# Patient Record
Sex: FEMALE
[De-identification: names, ages, dates, MRNs, and addresses within clinical notes are randomized per-mention and may not be internally consistent; named-entity substitution may affect disease eponyms.]

---

## 2020-02-07 PROBLEM — Z00.00 ENCOUNTER FOR PREVENTIVE HEALTH EXAMINATION: Status: ACTIVE | Noted: 2020-02-07

## 2020-02-10 ENCOUNTER — APPOINTMENT (OUTPATIENT)
Dept: PHYSICAL MEDICINE AND REHAB | Facility: CLINIC | Age: 65
End: 2020-02-10
Payer: OTHER MISCELLANEOUS

## 2020-02-10 VITALS
HEIGHT: 63 IN | OXYGEN SATURATION: 95 % | BODY MASS INDEX: 33.66 KG/M2 | HEART RATE: 65 BPM | RESPIRATION RATE: 16 BRPM | WEIGHT: 190 LBS | SYSTOLIC BLOOD PRESSURE: 143 MMHG | DIASTOLIC BLOOD PRESSURE: 90 MMHG

## 2020-02-10 DIAGNOSIS — Z86.79 PERSONAL HISTORY OF OTHER DISEASES OF THE CIRCULATORY SYSTEM: ICD-10-CM

## 2020-02-10 DIAGNOSIS — Z87.39 PERSONAL HISTORY OF OTHER DISEASES OF THE MUSCULOSKELETAL SYSTEM AND CONNECTIVE TISSUE: ICD-10-CM

## 2020-02-10 DIAGNOSIS — Z78.9 OTHER SPECIFIED HEALTH STATUS: ICD-10-CM

## 2020-02-10 DIAGNOSIS — Z80.0 FAMILY HISTORY OF MALIGNANT NEOPLASM OF DIGESTIVE ORGANS: ICD-10-CM

## 2020-02-10 DIAGNOSIS — Z82.49 FAMILY HISTORY OF ISCHEMIC HEART DISEASE AND OTHER DISEASES OF THE CIRCULATORY SYSTEM: ICD-10-CM

## 2020-02-10 DIAGNOSIS — Z80.1 FAMILY HISTORY OF MALIGNANT NEOPLASM OF TRACHEA, BRONCHUS AND LUNG: ICD-10-CM

## 2020-02-10 DIAGNOSIS — Z86.39 PERSONAL HISTORY OF OTHER ENDOCRINE, NUTRITIONAL AND METABOLIC DISEASE: ICD-10-CM

## 2020-02-10 DIAGNOSIS — Z87.19 PERSONAL HISTORY OF OTHER DISEASES OF THE DIGESTIVE SYSTEM: ICD-10-CM

## 2020-02-10 PROCEDURE — 99203 OFFICE O/P NEW LOW 30 MIN: CPT

## 2020-02-10 RX ORDER — PROPRANOLOL HYDROCHLORIDE 60 MG/1
60 CAPSULE, EXTENDED RELEASE ORAL
Qty: 30 | Refills: 0 | Status: DISCONTINUED | COMMUNITY
Start: 2019-07-26

## 2020-02-10 RX ORDER — AMOXICILLIN AND CLAVULANATE POTASSIUM 875; 125 MG/1; MG/1
875-125 TABLET, COATED ORAL
Qty: 20 | Refills: 0 | Status: DISCONTINUED | COMMUNITY
Start: 2019-11-25

## 2020-02-10 RX ORDER — OLMESARTAN MEDOXOMIL AND HYDROCHLOROTHIAZIDE 20; 12.5 MG/1; MG/1
20-12.5 TABLET ORAL
Qty: 90 | Refills: 0 | Status: ACTIVE | COMMUNITY
Start: 2019-09-03

## 2020-02-10 RX ORDER — VENLAFAXINE 37.5 MG/1
37.5 TABLET, EXTENDED RELEASE ORAL
Qty: 30 | Refills: 0 | Status: DISCONTINUED | COMMUNITY
Start: 2019-09-27

## 2020-02-10 RX ORDER — ONDANSETRON 4 MG/1
4 TABLET ORAL
Qty: 20 | Refills: 0 | Status: DISCONTINUED | COMMUNITY
Start: 2019-09-27

## 2020-02-10 RX ORDER — ATORVASTATIN CALCIUM 20 MG/1
20 TABLET, FILM COATED ORAL
Qty: 90 | Refills: 0 | Status: ACTIVE | COMMUNITY
Start: 2019-11-12

## 2020-02-10 RX ORDER — METHYLPREDNISOLONE 4 MG/1
4 TABLET ORAL
Qty: 21 | Refills: 0 | Status: DISCONTINUED | COMMUNITY
Start: 2020-01-20

## 2020-02-10 RX ORDER — BENZONATATE 200 MG/1
200 CAPSULE ORAL
Qty: 20 | Refills: 0 | Status: DISCONTINUED | COMMUNITY
Start: 2019-11-25

## 2020-02-10 RX ORDER — VALACYCLOVIR 1 G/1
1 TABLET, FILM COATED ORAL
Qty: 30 | Refills: 0 | Status: DISCONTINUED | COMMUNITY
Start: 2019-05-29

## 2020-02-10 RX ORDER — DOXYCYCLINE 100 MG/1
100 CAPSULE ORAL
Qty: 14 | Refills: 0 | Status: DISCONTINUED | COMMUNITY
Start: 2020-01-20

## 2020-02-10 RX ORDER — OMEPRAZOLE 20 MG/1
20 CAPSULE, DELAYED RELEASE ORAL
Qty: 90 | Refills: 0 | Status: ACTIVE | COMMUNITY
Start: 2019-09-27

## 2020-02-10 NOTE — SOCIAL HISTORY
[No Device Needed] : Patient doesn't use a device for ambulation [Lives Alone] : Only the patient, ~he/she~ lives alone [de-identified] : son is living with her temporarily

## 2020-02-10 NOTE — ASSESSMENT
[FreeTextEntry1] : Ms. Claire Olivares is a 65 year old female seen in office today  with prolonged symptoms post concussion that she sustained while at work, when a steel inez fell on her and hit her head on 5/4/2019. \par \par Due to psychological symptoms and PHQ-9  score of 15, recommend consultation with neuropsychologist for initial screen, psychotherapy, supportive counselling and eventual cognitive evaluation if necessary. \par \par 2. Post Concussive headaches: Trial of elavil or gabapentin. Discussed with patient who is agreeable. Side effects reviewed. Will also discuss with PCP \par \par \par 3. Follow up after evaluation by neuropsychologist. \par

## 2020-02-10 NOTE — HISTORY OF PRESENT ILLNESS
[FreeTextEntry1] : DOI: 5/4/2019\par \par 65  year old female seen in initial evaluation for prolonged symptoms following a concussion.\par \par On 5/14/2019 while at work at [employer] as a   a steel fixture fell on her head and hit her on the right side of scalp and then on left side. She denies any LOC. She did not fall down or sustain any other trauma. Local ice packs were used and her son drove her to [facility] where a head CT done was reported negative. She went home and stayed off work for a few days and since then has returned to work. \par She saw her Primary Care Physician, who referred her to neurology for concussion. Since this event, she reports headaches which involve the right side and left scalp sharp pain. She denies any nausea or emesis. She denies any diplopia, but states some vision changes for which she has prescribed new eye glasses. She had a MRI Brain and MRA Brain that she reports as normal. \par She has daily headaches. Has tried tylenol ES, and NSAIDs. She was also prescribed propranolol and Effexor. She stopped taking these medications as they were not beneficial. At this time she takes miloxicam ( over past 3 months) for foot pain issues. She gets daily headaches, somewhat relieved with miloxicam. She denies history of migraines\par She was followed by neurology on a monthly basis. The neurologist retired in December 2019 and hence she got an appointment here due to persistent complaints. She states that she had dizziness initially, but that since resolved. She was evaluated for vertigo and has severe symptoms and needed IV hydration at her GI physician's office\par She reports trouble falling asleep, interrupted sleep and feels tired. She feels more emotional than usual since this accident and endorses feeling of sadness and is easily irritated. \par She reports feeling slowed down, like in a "fog" and has trouble concentrating and remembering. She is worried about her future. \par Her Concussion symptom score is 48\par She has returned to work full time, but states that she has taken about 20-21 days off from work since the accident. She drives and her symptoms are not worsened by  driving.

## 2020-02-10 NOTE — PHYSICAL EXAM
[Normal] : Alert and in no acute distress [de-identified] : Tearful through exam  [de-identified] : JYOTI [de-identified] : s1s2 [de-identified] : No calf tenderness or edema [de-identified] : aaox3, no aphasia or dysarthria, EOMI, no nystagmus, no facial weakness, Facial sensation intact, shoulder shrug intact, Tongue midline, Motor, sensory exam, normal, No dysmetria. Gait is normal. No symptoms with quick head turns. Mild difficulty with single leg stance and tandem walking. MOCA score 24/30 [de-identified] : aaox3, however tearful and gets easily emotional throughout exam.

## 2020-06-01 ENCOUNTER — APPOINTMENT (OUTPATIENT)
Dept: PHYSICAL MEDICINE AND REHAB | Facility: CLINIC | Age: 65
End: 2020-06-01
Payer: OTHER MISCELLANEOUS

## 2020-06-01 DIAGNOSIS — S06.0X0A CONCUSSION W/OUT LOSS OF CONSCIOUSNESS, INITIAL ENCOUNTER: ICD-10-CM

## 2020-06-01 DIAGNOSIS — G44.309 POST-TRAUMATIC HEADACHE, UNSPECIFIED, NOT INTRACTABLE: ICD-10-CM

## 2020-06-01 PROCEDURE — 99212 OFFICE O/P EST SF 10 MIN: CPT | Mod: 95

## 2020-06-01 RX ORDER — GABAPENTIN 100 MG/1
100 CAPSULE ORAL AT BEDTIME
Qty: 30 | Refills: 1 | Status: DISCONTINUED | COMMUNITY
Start: 2020-02-24 | End: 2020-06-01

## 2020-06-01 RX ORDER — FAMOTIDINE 20 MG/1
20 TABLET, FILM COATED ORAL DAILY
Refills: 0 | Status: ACTIVE | COMMUNITY
Start: 2020-06-01

## 2020-06-01 RX ORDER — MELOXICAM 15 MG/1
15 TABLET ORAL
Qty: 20 | Refills: 0 | Status: DISCONTINUED | COMMUNITY
Start: 2020-01-21 | End: 2020-06-01

## 2020-06-01 RX ORDER — BUDESONIDE AND FORMOTEROL FUMARATE DIHYDRATE 160; 4.5 UG/1; UG/1
160-4.5 AEROSOL RESPIRATORY (INHALATION)
Refills: 0 | Status: ACTIVE | COMMUNITY
Start: 2020-06-01

## 2020-06-01 RX ORDER — FLUTICASONE PROPIONATE 50 UG/1
50 SPRAY, METERED NASAL
Refills: 0 | Status: ACTIVE | COMMUNITY
Start: 2020-06-01

## 2020-06-01 RX ORDER — AZELASTINE HYDROCHLORIDE 137 UG/1
0.1 SPRAY, METERED NASAL
Refills: 0 | Status: ACTIVE | COMMUNITY
Start: 2020-06-01

## 2020-06-01 NOTE — ASSESSMENT
[FreeTextEntry1] : Ms. Olivares is a 65 year old female, seen via telehealth visit for symptoms following a concussion. Her concussion symptoms have resolved.\par She will follow up with her PCP/Pulmonologist and GI physicians.\par I have also discussed with her to pursue supportive counselling if her symptoms of anxiety recur. \par \par

## 2020-06-01 NOTE — REASON FOR VISIT
[Home] : at home, [unfilled] , at the time of the visit. [Medical Office: ([facility])___] : at the medical office located in  [Follow-Up] : a follow-up visit [Verbal consent obtained from patient] : the patient, [unfilled]

## 2020-06-01 NOTE — REVIEW OF SYSTEMS
[Shortness Of Breath] : shortness of breath [Cough] : cough [Negative] : Respiratory [Chest Pain] : no chest pain [Eye Pain] : no eye pain [Fever] : no fever [Chills] : no chills [Headache] : no headaches

## 2020-06-01 NOTE — HISTORY OF PRESENT ILLNESS
[FreeTextEntry1] : DOI:5/4/2019\par \par Ms. Claire Olivares is a 65 year old female, seen via telehealth for post concussion symptoms. \par She was evaluated by neuropsychologist at [facility] and was recommended supportive counselling, which she was unable to pursue due to the Pandemic. \par Since her initial office visit, she has been home bound due to covid 19 pandemic. She states that she had symptoms of cough and congestion and was evaluation at urgent care, her PCP and a Pulmonologist. She has completed few courses of antibiotics and was also prescribed inhalers and nebulisers. She states that her COVID test was negative.\par She also had a colonoscopy and endoscopy and per her GI physician recommendations increased her dose of PPI and pepcid was added to regimen. She reports that her symptoms are better over past 2 weeks. \par She states that her headaches have improved and she is not taking any more medication. She also states that her anxiety is somewhat better. \par She denies any falls or loss of balance.\par She is not working currently as her work place closed due the pandemic.

## 2020-06-01 NOTE — PHYSICAL EXAM
[FreeTextEntry1] : Exam is limited due to nature of telehealth visit\par Patient appears comfortable and in NAD\par

## 2023-06-06 ENCOUNTER — INPATIENT (INPATIENT)
Facility: HOSPITAL | Age: 68
LOS: 0 days | Discharge: HOME | DRG: 274 | End: 2023-06-07
Attending: INTERNAL MEDICINE | Admitting: INTERNAL MEDICINE
Payer: COMMERCIAL

## 2023-06-06 ENCOUNTER — TRANSCRIPTION ENCOUNTER (OUTPATIENT)
Age: 68
End: 2023-06-06

## 2023-06-06 VITALS
RESPIRATION RATE: 18 BRPM | WEIGHT: 186.95 LBS | OXYGEN SATURATION: 96 % | HEART RATE: 98 BPM | TEMPERATURE: 98 F | DIASTOLIC BLOOD PRESSURE: 88 MMHG | SYSTOLIC BLOOD PRESSURE: 121 MMHG | HEIGHT: 63 IN

## 2023-06-06 DIAGNOSIS — I48.91 UNSPECIFIED ATRIAL FIBRILLATION: ICD-10-CM

## 2023-06-06 DIAGNOSIS — Z86.018 PERSONAL HISTORY OF OTHER BENIGN NEOPLASM: Chronic | ICD-10-CM

## 2023-06-06 DIAGNOSIS — Z98.890 OTHER SPECIFIED POSTPROCEDURAL STATES: Chronic | ICD-10-CM

## 2023-06-06 LAB
BLD GP AB SCN SERPL QL: NEGATIVE — SIGNIFICANT CHANGE UP
RH IG SCN BLD-IMP: POSITIVE — SIGNIFICANT CHANGE UP
RH IG SCN BLD-IMP: POSITIVE — SIGNIFICANT CHANGE UP

## 2023-06-06 PROCEDURE — 93010 ELECTROCARDIOGRAM REPORT: CPT

## 2023-06-06 PROCEDURE — 93010 ELECTROCARDIOGRAM REPORT: CPT | Mod: 77

## 2023-06-06 RX ORDER — DILTIAZEM HCL 120 MG
240 CAPSULE, EXT RELEASE 24 HR ORAL DAILY
Refills: 0 | Status: DISCONTINUED | OUTPATIENT
Start: 2023-06-06 | End: 2023-06-07

## 2023-06-06 RX ORDER — ATORVASTATIN CALCIUM 80 MG/1
20 TABLET, FILM COATED ORAL AT BEDTIME
Refills: 0 | Status: DISCONTINUED | OUTPATIENT
Start: 2023-06-06 | End: 2023-06-07

## 2023-06-06 RX ORDER — APIXABAN 2.5 MG/1
5 TABLET, FILM COATED ORAL
Refills: 0 | Status: DISCONTINUED | OUTPATIENT
Start: 2023-06-06 | End: 2023-06-07

## 2023-06-06 RX ORDER — LOSARTAN POTASSIUM 100 MG/1
50 TABLET, FILM COATED ORAL DAILY
Refills: 0 | Status: DISCONTINUED | OUTPATIENT
Start: 2023-06-06 | End: 2023-06-07

## 2023-06-06 RX ORDER — NYSTATIN CREAM 100000 [USP'U]/G
1 CREAM TOPICAL
Qty: 1 | Refills: 3
Start: 2023-06-06 | End: 2023-10-03

## 2023-06-06 RX ORDER — ONDANSETRON 8 MG/1
4 TABLET, FILM COATED ORAL ONCE
Refills: 0 | Status: COMPLETED | OUTPATIENT
Start: 2023-06-06 | End: 2023-06-06

## 2023-06-06 RX ORDER — NYSTATIN CREAM 100000 [USP'U]/G
1 CREAM TOPICAL
Refills: 0 | Status: DISCONTINUED | OUTPATIENT
Start: 2023-06-06 | End: 2023-06-07

## 2023-06-06 RX ORDER — PANTOPRAZOLE SODIUM 20 MG/1
40 TABLET, DELAYED RELEASE ORAL
Refills: 0 | Status: DISCONTINUED | OUTPATIENT
Start: 2023-06-06 | End: 2023-06-07

## 2023-06-06 RX ADMIN — ONDANSETRON 4 MILLIGRAM(S): 8 TABLET, FILM COATED ORAL at 17:36

## 2023-06-06 RX ADMIN — NYSTATIN CREAM 1 APPLICATION(S): 100000 CREAM TOPICAL at 17:36

## 2023-06-06 RX ADMIN — PANTOPRAZOLE SODIUM 40 MILLIGRAM(S): 20 TABLET, DELAYED RELEASE ORAL at 18:23

## 2023-06-06 RX ADMIN — APIXABAN 5 MILLIGRAM(S): 2.5 TABLET, FILM COATED ORAL at 18:23

## 2023-06-06 NOTE — DISCHARGE NOTE PROVIDER - NSDCMRMEDTOKEN_GEN_ALL_CORE_FT
calcium (as carbonate and lactate)-vitamin D 200 mg-6.25 mcg oral tablet: 2 orally once a day  DilTIAZem (Eqv-Cardizem CD) 240 mg/24 hours oral capsule, extended release: 1 orally once a day  Eliquis 5 mg oral tablet: 1 orally 2 times a day  Lipitor 20 mg oral tablet: 1 orally once a day (at bedtime)  Multiple Vitamins oral tablet: 1 orally once a day  nystatin 100,000 units/g topical powder: Apply topically to affected area 2 times a day 1 Apply topically to affected area 2 times a day  olmesartan-hydrochlorothiazide 20 mg-12.5 mg oral tablet: 1 orally once a day  omeprazole 20 mg oral delayed release capsule: 1 orally once a day  psyllium 50% oral powder for reconstitution: 1 gram(s) orally 3 times a day

## 2023-06-06 NOTE — DISCHARGE NOTE PROVIDER - NSDCCPCAREPLAN_GEN_ALL_CORE_FT
PRINCIPAL DISCHARGE DIAGNOSIS  Diagnosis: Unspecified atrial fibrillation  Assessment and Plan of Treatment: Atrial fibrillation is a condition in which your heart's upper chambers (atria) beat too quickly. This causes the heart to beat in a fast, irregular rhythm. Atrial fibrillation is a type of heart rhythm disorder (arrhythmia) caused by problems in your heart's electrical system. Without treatment, atrial flutter can also cause a fast pulse rate for long periods of time. This means that the ventricles are beating too fast, and the heart muscle can become weak. This can lead to heart failure and long-term disability.  You underwent a successful atrial fibrrillation ablation on 6/6/23. The procedure was done through the groin. Please avoid any heavy lifting (no more than 3 to 5 lbs), strenuous activity, bending, straining, or unnecessary stair climbing for 2 weeks. No driving for 2 days. You may shower 24 hours following the procedure but avoid baths/swimming for 1 week. If you develop any swelling, bleeding, hardening of the skin (hematoma formation), acute pain, numbness/tingling in your leg, or have any questions/concerns regarding your procedure, please call [facility] [phone]  Take all medications as prescribed. Limit your intake of coffee, tea, cola, and other beverages with caffeine. Talk with your doctor about whether you should eliminate caffeine. Avoid over-the-counter medicines that have caffeine in them.  Follow up with your cardologist within 1 week of discharge.      SECONDARY DISCHARGE DIAGNOSES  Diagnosis: HTN (hypertension)  Assessment and Plan of Treatment: You have high blood pressure. Continue taking your blood pressure medications as prescribed. Eat a heart healthy diet with low salt; exercise regularly (if cleared by your primary care doctor or cardiologist). Maintain a heart healthy weight and include healthy ways to manage stress. If you smoke, quit. If you need assistance to help you stop smoking, please use the following resource: [facility] – ([phone]). Follow up with your primary care doctor to continue having your blood pressure checked on a continual basis.    Diagnosis: HLD (hyperlipidemia)  Assessment and Plan of Treatment: LDL<70   Goal is to keep LDL<70. Continue with your cholesterol medications as prescribed. Eat a heart healthy diet that is low in saturated fats and salt, and includes whole grains, fruits, vegetables and lean protein; exercise regularly (consult with your physician or cardiologist first); maintain a heart healthy weight; if you smoke - quit (A resource to help you stop smoking is the [facility] – phone number [phone].). Continue to follow with your primary physician or cardiologist.

## 2023-06-06 NOTE — DISCHARGE NOTE PROVIDER - HOSPITAL COURSE
HPI:  68 yr old female with PMHx of ?TIA (Visual disturbances 2005-work up negative), HTN, HLD, presents with occasional mild palpitations and her watch demonstrating rapid heart rates. Pt was found in Afib when seen for back pain for pain management. Pt presents today for Afib Ablation .  (06 Jun 2023 09:46)    Now s/p successful afib ablation 6/6/23 via bilateral femoral veins s/p manual pull and compression    MAD in right pannus; nystatin cream applied    Patient seen and examined at the bedside on 6/7/23. No significant events on telemetry overnight. AM labs wnl, VSS/HD stable. Bilateral groin sites stable; no hematoma or bruit. Denies any complaints at this time. Patient has been medically cleared for discharge as per Dr. Villalobos. Patient has been given appropriate discharge instructions including medication regimen, access site management and follow up. Medications that patient needs refills on (+/- new medications) have been e-prescribed to preferred pharmacy. Patient will f/u with Dr. Villalobos in 1-2 weeks for further management.     VSS  Gen: NAD, A&O x3  Cards: RRR, clear S1 and S2 without murmur  Pulm: CTA B/L without w/r/r  Vascular: Bilateral groins w/o hematoma, ooze or bruit. Peripheral pulses 2+ B/L  Abd: soft, NT  Ext: no LE edema or ulcerations B/L

## 2023-06-06 NOTE — DISCHARGE NOTE PROVIDER - NSDCFUADDINST_GEN_ALL_CORE_FT
WOUND CARE:  The day AFTER your procedure  - Remove the bandage at the site GENTLY, clean with mild soap and water, and pat dry; leave open to air  - You may shower   - DO NOT apply lotions, creams, ointments, powder, perfumes to your incision site  - Check your groin every day. A small amount of bruising or soreness is normal, a bump (smaller than nickel) might be present, which is normal  - DO NOT SOAK your site for 1 week (no baths, no pools, no tubs, etc..)    ACTIVITY:  Your procedure was done through your groin  For the next 5 DAYS:  - Limit climbing stairs, no strenuous activity, pushing , pulling, or straining     DO NOT LIFT anything 10 lbs or heavier   - You may resume sexual activity in 7 days, unless instructed otherwise  - Mild palpitations are normal     Follow heart healthy diet recommended by your doctor.   IF you smoke, STOP SMOKING (may call [phone] for center of tobacco control if you need assistance).    For the next 24 hours:   - Stay at home and rest, do not drive or operate heavy machinery  - Do not drink alcoholic beverages   - Do not make important personal or business decisions     ***CALL YOUR DOCTOR ***  IF you have fever, chills, body aches, or severe pain, swelling, redness, heat, yellow drainage from your incision site  IF bleeding or significant new swelling from your puncture site  IF you experience rapid heartbeat or palpitations that cause: lightheadedness, dizziness, or fainting spells  If you experience difficulty swallowing, or pain with swallowing   IF unable to get in contact with your doctor, you may call the Cardiology Office at [facility] at [phone]

## 2023-06-06 NOTE — PATIENT PROFILE ADULT - FALL HARM RISK - UNIVERSAL INTERVENTIONS
Bed in lowest position, wheels locked, appropriate side rails in place/Call bell, personal items and telephone in reach/Instruct patient to call for assistance before getting out of bed or chair/Non-slip footwear when patient is out of bed/[place] to call system/Physically safe environment - no spills, clutter or unnecessary equipment/Purposeful Proactive Rounding/Room/bathroom lighting operational, light cord in reach

## 2023-06-06 NOTE — DISCHARGE NOTE PROVIDER - CARE PROVIDER_API CALL
Jason Villalobos  Cardiac Electrophysiology  [address] [place] [address], [facility] [address]  [place] [address]  Phone: [phone]  Fax: [phone]  Established Patient  Follow Up Time: 2 weeks

## 2023-06-06 NOTE — H&P CARDIOLOGY - NSICDXPASTMEDICALHX_GEN_ALL_CORE_FT
PAST MEDICAL HISTORY:  Afib     HLD (hyperlipidemia)     HTN (hypertension)     TIA (transient ischemic attack)

## 2023-06-06 NOTE — DISCHARGE NOTE PROVIDER - NSDCCPTREATMENT_GEN_ALL_CORE_FT
PRINCIPAL PROCEDURE  Procedure: Intracardiac catheter ablation for atrial fibrillation  Findings and Treatment: Study date 6/6/23:  Indications   Atrial Fibrillation   Primary ICD-10 CM   I48.1 - Persistent atrial fibrillation   Diagnosis   Pre Diagnosis:   Atrial fibrillation   Post Diagnosis:   Same   CPT Code:                  30555 - Ablation, Atrial Fibrillation    94864 - Intraventricular Mapping   Procedures Performed   Primary Procedures:      Atrial Fibrillation Ablation (initial)   Conclusions   Cryoablation was performed   All 4 pulmonary veins isolated with cryo alone   Follow-Up   The patient hasbeen instructed to follow up with the procedural  cardiologist in 10-14 days.  Complications   No complications, No complications

## 2023-06-06 NOTE — H&P CARDIOLOGY - HISTORY OF PRESENT ILLNESS
68 yr old female with PMHx of ?TIA (Visual disturbances 2005-work up negative), HTN, HLD, presents with occasional mild palpitations and her watch demonstrating rapid heart rates. Pt was found in Afib when seen for back pain for pain management.  68 yr old female with PMHx of ?TIA (Visual disturbances 2005-work up negative), HTN, HLD, presents with occasional mild palpitations and her watch demonstrating rapid heart rates. Pt was found in Afib when seen for back pain for pain management. Pt presents today for Afib Ablation .

## 2023-06-06 NOTE — PRE-ANESTHESIA EVALUATION ADULT - NSANTHPMHFT_GEN_ALL_CORE
68 yr old female with PMHx of ?TIA (Visual disturbances 2005-work up negative), HTN, HLD, presents with occasional mild palpitations and her watch demonstrating rapid heart rates. Pt was found in Afib when seen for back pain for pain management.

## 2023-06-07 ENCOUNTER — TRANSCRIPTION ENCOUNTER (OUTPATIENT)
Age: 68
End: 2023-06-07

## 2023-06-07 VITALS
SYSTOLIC BLOOD PRESSURE: 101 MMHG | HEART RATE: 90 BPM | RESPIRATION RATE: 17 BRPM | TEMPERATURE: 98 F | DIASTOLIC BLOOD PRESSURE: 67 MMHG | OXYGEN SATURATION: 96 %

## 2023-06-07 LAB
ANION GAP SERPL CALC-SCNC: 10 MMOL/L — SIGNIFICANT CHANGE UP (ref 5–17)
BUN SERPL-MCNC: 19 MG/DL — SIGNIFICANT CHANGE UP (ref 7–23)
CALCIUM SERPL-MCNC: 8.6 MG/DL — SIGNIFICANT CHANGE UP (ref 8.4–10.5)
CHLORIDE SERPL-SCNC: 105 MMOL/L — SIGNIFICANT CHANGE UP (ref 96–108)
CO2 SERPL-SCNC: 23 MMOL/L — SIGNIFICANT CHANGE UP (ref 22–31)
CREAT SERPL-MCNC: 0.8 MG/DL — SIGNIFICANT CHANGE UP (ref 0.5–1.3)
EGFR: 80 ML/MIN/1.73M2 — SIGNIFICANT CHANGE UP
GLUCOSE SERPL-MCNC: 101 MG/DL — HIGH (ref 70–99)
HCT VFR BLD CALC: 37.1 % — SIGNIFICANT CHANGE UP (ref 34.5–45)
HGB BLD-MCNC: 12.5 G/DL — SIGNIFICANT CHANGE UP (ref 11.5–15.5)
MCHC RBC-ENTMCNC: 31.4 PG — SIGNIFICANT CHANGE UP (ref 27–34)
MCHC RBC-ENTMCNC: 33.7 GM/DL — SIGNIFICANT CHANGE UP (ref 32–36)
MCV RBC AUTO: 93.2 FL — SIGNIFICANT CHANGE UP (ref 80–100)
NRBC # BLD: 0 /100 WBCS — SIGNIFICANT CHANGE UP (ref 0–0)
PLATELET # BLD AUTO: 203 K/UL — SIGNIFICANT CHANGE UP (ref 150–400)
POTASSIUM SERPL-MCNC: 3.5 MMOL/L — SIGNIFICANT CHANGE UP (ref 3.5–5.3)
POTASSIUM SERPL-SCNC: 3.5 MMOL/L — SIGNIFICANT CHANGE UP (ref 3.5–5.3)
RBC # BLD: 3.98 M/UL — SIGNIFICANT CHANGE UP (ref 3.8–5.2)
RBC # FLD: 12.9 % — SIGNIFICANT CHANGE UP (ref 10.3–14.5)
SODIUM SERPL-SCNC: 138 MMOL/L — SIGNIFICANT CHANGE UP (ref 135–145)
WBC # BLD: 8.77 K/UL — SIGNIFICANT CHANGE UP (ref 3.8–10.5)
WBC # FLD AUTO: 8.77 K/UL — SIGNIFICANT CHANGE UP (ref 3.8–10.5)

## 2023-06-07 PROCEDURE — 93010 ELECTROCARDIOGRAM REPORT: CPT

## 2023-06-07 RX ORDER — POTASSIUM CHLORIDE 20 MEQ
40 PACKET (EA) ORAL EVERY 4 HOURS
Refills: 0 | Status: COMPLETED | OUTPATIENT
Start: 2023-06-07 | End: 2023-06-07

## 2023-06-07 RX ORDER — ACETAMINOPHEN 500 MG
650 TABLET ORAL ONCE
Refills: 0 | Status: COMPLETED | OUTPATIENT
Start: 2023-06-07 | End: 2023-06-07

## 2023-06-07 RX ADMIN — Medication 40 MILLIEQUIVALENT(S): at 09:38

## 2023-06-07 RX ADMIN — Medication 650 MILLIGRAM(S): at 07:51

## 2023-06-07 RX ADMIN — PANTOPRAZOLE SODIUM 40 MILLIGRAM(S): 20 TABLET, DELAYED RELEASE ORAL at 05:11

## 2023-06-07 RX ADMIN — NYSTATIN CREAM 1 APPLICATION(S): 100000 CREAM TOPICAL at 05:12

## 2023-06-07 RX ADMIN — Medication 240 MILLIGRAM(S): at 05:12

## 2023-06-07 RX ADMIN — APIXABAN 5 MILLIGRAM(S): 2.5 TABLET, FILM COATED ORAL at 05:11

## 2023-06-07 RX ADMIN — Medication 40 MILLIEQUIVALENT(S): at 05:12

## 2023-06-07 NOTE — PROGRESS NOTE ADULT - SUBJECTIVE AND OBJECTIVE BOX
Health system INVASIVE CARDIOLOGY   Cardiac cath lab - [facility] Team  [phone]     Chief complaint: Patient is a 68y old  Female who presents with a chief complaint of Afib ablation    HPI:  68 yr old female with PMHx of ?TIA (Visual disturbances 2005-work up negative), HTN, HLD, presents with occasional mild palpitations and her watch demonstrating rapid heart rates. Pt was found in Afib when seen for back pain for pain management. Pt presents today for Afib Ablation .  (06 Jun 2023 09:46)      Subjective/Observations: Patient seen and assessed at bedside. Denies changes in vision, headaches, dizziness, chest pain, palpitations, SOB, abdominal pain, N/V/D, leg pain/edema or any other complaints.       ROS Negative except as per Subjective and HPI      PAST MEDICAL & SURGICAL HISTORY:  Afib      HTN (hypertension)      HLD (hyperlipidemia)      TIA (transient ischemic attack)      H/O hemorrhoidectomy      History of uterine fibroid          ALLERGIES:  No Known Allergies      MEDICATIONS:  diltiazem    milliGRAM(s) Oral daily  hydrochlorothiazide 12.5 milliGRAM(s) Oral daily  losartan 50 milliGRAM(s) Oral daily    pantoprazole    Tablet 40 milliGRAM(s) Oral before breakfast    atorvastatin 20 milliGRAM(s) Oral at bedtime    apixaban 5 milliGRAM(s) Oral two times a day  nystatin Powder 1 Application(s) Topical two times a day      TELEMETRY:  T(C): 36.6 (06-06-23 @ 19:56), Max: 36.8 (06-06-23 @ 16:25)  HR: 90 (06-06-23 @ 21:20) (69 - 98)  BP: 95/67 (06-06-23 @ 21:20) (91/57 - 121/88)  RR: 17 (06-06-23 @ 21:20) (12 - 20)  SpO2: 97% (06-06-23 @ 21:20) (92% - 98%)  Wt(kg): --  I&O's Summary    Height (cm): 160 (06-06 @ 13:19)  Weight (kg): 84.8 (06-06 @ 13:19)  BMI (kg/m2): 33.1 (06-06 @ 13:19)  BSA (m2): 1.88 (06-06 @ 13:19)  [place]:  Central/PICC/Mid Line:                                         FOCUSED PHYSICAL EXAM:  Appearance: Normal  Cardiovascular: Normal S1 S2, No JVD, No murmurs, rubs, gallops or clicks  Respiratory: Lungs clear to auscultation. No Rales, Rhonchi, Wheezing	  Vascular: bilateral groins stable w/o bleeding or hematoma, soft, + pulses     ECG:  	  RADIOLOGY:   DIAGNOSTIC TESTING:  [ ] Echocardiogram:  [ ] Catheterization:  [ ] Stress Test:    [ ] ALDO  [ ] CCTA  OTHER: 	  < from: Electrophysiology Order (06.06.23 @ 14:11) >  Indications   Atrial Fibrillation     Primary ICD-10 CM   I48.1 - Persistent atrial fibrillation   Diagnosis   Pre Diagnosis:   Atrial fibrillation     Post Diagnosis:   Same     CPT Code:                  75308 - Ablation, Atrial Fibrillation    29651 - Intraventricular Mapping     Procedures Performed   Primary Procedures:      Atrial Fibrillation Ablation (initial)     Conclusions   Cryoablation was performed   All 4 pulmonary veins isolated with cryo alone     Follow-Up   The patient hasbeen instructed to follow up with the procedural  cardiologist in 10-14 days.    Complications   No complications, No complications     < end of copied text >      LABS: All labs reviewed	 	  CARDIAC MARKERS:        proBNP:   Lipid Profile:   HgA1c:   TSH:

## 2023-06-07 NOTE — DISCHARGE NOTE NURSING/CASE MANAGEMENT/SOCIAL WORK - PATIENT PORTAL LINK FT
You can access the FollowMyHealth Patient Portal offered by [facility] by registering at the following website: [email or web address]. By joining [facility]’s FollowMyHealth portal, you will also be able to view your health information using other applications (apps) compatible with our system.

## 2023-06-07 NOTE — PROGRESS NOTE ADULT - ASSESSMENT
69 y/o Female w/ PMHx of HTN, HLD, ?TIA (visual disturbances 2005, evaluation negative) presented to the hospital c/o palpitations; found to be in Atrial fibrillation and admitted for ablation. Now s/p successful Afib ablation 6/6    # Atrial fibrillation  - [id number] risk stratification score: 3  - s/p successful Afib cryoablation 6/6/23 w/ all 4 PVI w/ cryo via bilateral femoral veins  - Continue Eliquis 5mg BID and Diltiazem 240mg QD    # MAD  - Right pannus w/ fungus  - Continue Nystatin cream to affected area  - Keep area clean and dry    # HTN  - Normotensive  - Continue Olmesartan-HCTZ 20-12.5mg TIC: Losartan 50mg QD and Hydrochlorothiazide 12.5mg QD    # HLD  - Continue Atorvastatin 20mg QHS    # PPx  - Diet: DASH/TLC  - Dispo: likely today    Sussy Singleton PA-C  Invasive Cardiology  [phone]

## 2023-07-18 ENCOUNTER — OUTPATIENT (OUTPATIENT)
Dept: OUTPATIENT SERVICES | Facility: HOSPITAL | Age: 68
LOS: 1 days | End: 2023-07-18
Payer: COMMERCIAL

## 2023-07-18 DIAGNOSIS — Z98.890 OTHER SPECIFIED POSTPROCEDURAL STATES: Chronic | ICD-10-CM

## 2023-07-18 DIAGNOSIS — Z86.018 PERSONAL HISTORY OF OTHER BENIGN NEOPLASM: Chronic | ICD-10-CM

## 2023-07-18 PROCEDURE — 93005 ELECTROCARDIOGRAM TRACING: CPT

## 2023-07-19 DIAGNOSIS — I48.91 UNSPECIFIED ATRIAL FIBRILLATION: ICD-10-CM

## 2023-07-20 PROCEDURE — C1766: CPT

## 2023-07-20 PROCEDURE — C1759: CPT

## 2023-07-20 PROCEDURE — C1733: CPT

## 2023-07-20 PROCEDURE — C1893: CPT

## 2023-07-20 PROCEDURE — 86850 RBC ANTIBODY SCREEN: CPT

## 2023-07-20 PROCEDURE — 86900 BLOOD TYPING SEROLOGIC ABO: CPT

## 2023-07-20 PROCEDURE — C1769: CPT

## 2023-07-20 PROCEDURE — 85027 COMPLETE CBC AUTOMATED: CPT

## 2023-07-20 PROCEDURE — 93005 ELECTROCARDIOGRAM TRACING: CPT

## 2023-07-20 PROCEDURE — C1730: CPT

## 2023-07-20 PROCEDURE — C1894: CPT

## 2023-07-20 PROCEDURE — 86901 BLOOD TYPING SEROLOGIC RH(D): CPT

## 2023-07-20 PROCEDURE — 93656 COMPRE EP EVAL ABLTJ ATR FIB: CPT

## 2023-07-20 PROCEDURE — 80048 BASIC METABOLIC PNL TOTAL CA: CPT

## 2023-07-20 PROCEDURE — C1889: CPT

## 2023-07-20 PROCEDURE — 36415 COLL VENOUS BLD VENIPUNCTURE: CPT

## 2024-02-22 RX ORDER — APIXABAN 2.5 MG/1
1 TABLET, FILM COATED ORAL
Refills: 0 | DISCHARGE

## 2024-02-22 RX ORDER — OMEPRAZOLE 10 MG/1
1 CAPSULE, DELAYED RELEASE ORAL
Refills: 0 | DISCHARGE

## 2024-02-22 RX ORDER — PSYLLIUM SEED (WITH DEXTROSE)
1 POWDER (GRAM) ORAL
Refills: 0 | DISCHARGE

## 2024-02-22 RX ORDER — OLMESARTAN MEDOXOMIL-HYDROCHLOROTHIAZIDE 25; 40 MG/1; MG/1
1 TABLET, FILM COATED ORAL
Refills: 0 | DISCHARGE

## 2024-02-22 RX ORDER — ATORVASTATIN CALCIUM 80 MG/1
1 TABLET, FILM COATED ORAL
Refills: 0 | DISCHARGE

## 2024-02-22 RX ORDER — DILTIAZEM HCL 120 MG
1 CAPSULE, EXT RELEASE 24 HR ORAL
Refills: 0 | DISCHARGE